# Patient Record
Sex: MALE | Race: WHITE | Employment: FULL TIME | URBAN - METROPOLITAN AREA
[De-identification: names, ages, dates, MRNs, and addresses within clinical notes are randomized per-mention and may not be internally consistent; named-entity substitution may affect disease eponyms.]

---

## 2021-06-04 ENCOUNTER — HOSPITAL ENCOUNTER (INPATIENT)
Age: 21
LOS: 5 days | Discharge: HOME | DRG: 885 | End: 2021-06-09
Attending: PSYCHIATRY & NEUROLOGY | Admitting: PSYCHIATRY & NEUROLOGY
Payer: COMMERCIAL

## 2021-06-04 PROBLEM — F32.A DEPRESSION WITH SUICIDAL IDEATION: Status: ACTIVE | Noted: 2021-06-04

## 2021-06-04 PROBLEM — R45.851 DEPRESSION WITH SUICIDAL IDEATION: Status: ACTIVE | Noted: 2021-06-04

## 2021-06-04 PROCEDURE — 1240000000 HC EMOTIONAL WELLNESS R&B

## 2021-06-05 PROBLEM — F33.2 SEVERE EPISODE OF RECURRENT MAJOR DEPRESSIVE DISORDER, WITHOUT PSYCHOTIC FEATURES (HCC): Status: ACTIVE | Noted: 2021-06-05

## 2021-06-05 PROCEDURE — 6370000000 HC RX 637 (ALT 250 FOR IP): Performed by: PSYCHIATRY & NEUROLOGY

## 2021-06-05 PROCEDURE — 1240000000 HC EMOTIONAL WELLNESS R&B

## 2021-06-05 PROCEDURE — 99223 1ST HOSP IP/OBS HIGH 75: CPT | Performed by: PSYCHIATRY & NEUROLOGY

## 2021-06-05 PROCEDURE — APPSS60 APP SPLIT SHARED TIME 46-60 MINUTES

## 2021-06-05 RX ORDER — TRAZODONE HYDROCHLORIDE 50 MG/1
50 TABLET ORAL NIGHTLY PRN
Status: DISCONTINUED | OUTPATIENT
Start: 2021-06-05 | End: 2021-06-08

## 2021-06-05 RX ORDER — IBUPROFEN 400 MG/1
400 TABLET ORAL EVERY 6 HOURS PRN
Status: DISCONTINUED | OUTPATIENT
Start: 2021-06-05 | End: 2021-06-09 | Stop reason: HOSPADM

## 2021-06-05 RX ORDER — HYDROXYZINE 50 MG/1
50 TABLET, FILM COATED ORAL 3 TIMES DAILY PRN
Status: DISCONTINUED | OUTPATIENT
Start: 2021-06-05 | End: 2021-06-09 | Stop reason: HOSPADM

## 2021-06-05 RX ORDER — MAGNESIUM HYDROXIDE/ALUMINUM HYDROXICE/SIMETHICONE 120; 1200; 1200 MG/30ML; MG/30ML; MG/30ML
30 SUSPENSION ORAL EVERY 6 HOURS PRN
Status: DISCONTINUED | OUTPATIENT
Start: 2021-06-05 | End: 2021-06-09 | Stop reason: HOSPADM

## 2021-06-05 RX ORDER — POLYETHYLENE GLYCOL 3350 17 G/17G
17 POWDER, FOR SOLUTION ORAL DAILY PRN
Status: DISCONTINUED | OUTPATIENT
Start: 2021-06-05 | End: 2021-06-09 | Stop reason: HOSPADM

## 2021-06-05 RX ORDER — ACETAMINOPHEN 325 MG/1
650 TABLET ORAL EVERY 4 HOURS PRN
Status: DISCONTINUED | OUTPATIENT
Start: 2021-06-05 | End: 2021-06-09 | Stop reason: HOSPADM

## 2021-06-05 RX ADMIN — NICOTINE POLACRILEX 2 MG: 2 GUM, CHEWING BUCCAL at 09:44

## 2021-06-05 RX ADMIN — NICOTINE POLACRILEX 2 MG: 2 GUM, CHEWING BUCCAL at 18:08

## 2021-06-05 RX ADMIN — NICOTINE POLACRILEX 2 MG: 2 GUM, CHEWING BUCCAL at 12:02

## 2021-06-05 ASSESSMENT — LIFESTYLE VARIABLES
HISTORY_ALCOHOL_USE: NO
HISTORY_ALCOHOL_USE: NO

## 2021-06-05 ASSESSMENT — SLEEP AND FATIGUE QUESTIONNAIRES
DO YOU USE A SLEEP AID: NO
AVERAGE NUMBER OF SLEEP HOURS: 6
DO YOU HAVE DIFFICULTY SLEEPING: NO

## 2021-06-05 ASSESSMENT — PAIN SCALES - GENERAL: PAINLEVEL_OUTOF10: 0

## 2021-06-05 ASSESSMENT — PATIENT HEALTH QUESTIONNAIRE - PHQ9: SUM OF ALL RESPONSES TO PHQ QUESTIONS 1-9: 5

## 2021-06-05 NOTE — PLAN OF CARE
[address] [facility]  Initial Interdisciplinary Treatment Plan NO      Original treatment plan Date & Time: 6/5/21 4084    Admission Type:  Admission Type:  Involuntary    Reason for admission:   Reason for Admission: depression with suicidal thoughts    Estimated Length of Stay:  5-7days  Estimated Discharge Date: to be determined by physician    PATIENT STRENGTHS:  Patient Strengths:Strengths: Communication, Employment  Patient Strengths and Limitations:Limitations: Difficulty problem solving/relies on others to help solve problems  Addictive Behavior: Addictive Behavior  In the past 3 months, have you felt or has someone told you that you have a problem with:  : None  Do you have a history of Chemical Use?: No  Do you have a history of Alcohol Use?: No  Do you have a history of Street Drug Abuse?: No  Histroy of Prescripton Drug Abuse?: No  Medical Problems:  Past Medical History:   Diagnosis Date    Psychiatric problem      Status EXAM:Status and Exam  Normal: No  Facial Expression: Flat  Affect: Appropriate, Congruent  Level of Consciousness: Alert  Mood:Normal: No  Mood: Depressed  Motor Activity:Normal: Yes  Interview Behavior: Cooperative  Attention:Normal: Yes  Thought Content:Normal: Yes  Hallucinations: None  Delusions: No  Memory:Normal: Yes  Insight and Judgment: No  Insight and Judgment: Poor Insight  Present Suicidal Ideation: No  Present Homicidal Ideation: No    EDUCATION:   Learner Progress Toward Treatment Goals: reviewed group plans and strategies for care    Method:group therapy, medication compliance, individualized assessments and care planning    Outcome: needs reinforcement    PATIENT GOALS: to be discussed with patient within 72 hours    PLAN/TREATMENT RECOMMENDATIONS:     continue group therapy , medications compliance, goal setting, individualized assessments and care, continue to monitor pt on unit      SHORT-TERM GOALS:   Time frame for Short-Term Goals: 5-7 days    LONG-TERM GOALS:  Time frame for Long-Term Goals: 6 months  Members Present in Team Meeting: See Signature Sheet    [place], [place]

## 2021-06-05 NOTE — GROUP NOTE
Group Therapy Note    Date: 6/5/2021    Group Start Time: 1110  Group End Time: 9631  Group Topic: Healthy Living/Wellness    KARAN Guzmán        Group Therapy Note    Attendees: 12/22     Patient's Goal:  Watch video and discuss afterward    Notes:  CLEO talk: Emotional Hygiene/Psychological First Aid    Status After Intervention:  Unchanged    Participation Level: Active Listener    Participation Quality: Appropriate and Attentive      Speech:  normal      Thought Process/Content: Logical      Affective Functioning: Congruent      Mood: stable      Level of consciousness:  Alert and Attentive      Response to Learning: Progressing to goal      Endings: None Reported    Modes of Intervention: Education and Media      Discipline Responsible: Behavorial Health Tech      Signature:   Dagoberto Castillo

## 2021-06-05 NOTE — PLAN OF CARE
Problem: Altered Mood, Depressive Behavior:  Goal: Ability to disclose and discuss suicidal ideas will improve  Description: Ability to disclose and discuss suicidal ideas will improve  6/5/2021 1226 by Isrrael Quijano RN  Outcome: Ongoing   1:1 with pt x ten minutes. Pt encouraged to attend unit programming and interact with peers and staff. Pt also encouraged to tend to hygiene and ADLs. Pt encouraged to discuss feelings with staff and feedback and reassurance provided. Pt denies thoughts of self harm and is agreeable to seeking out should thoughts of self harm arise. Safe environment maintained. Q15 minute checks for safety cont per unit policy. Will cont to monitor for safety and provides support and reassurance as needed. Pt is controlled in behaviors. Attends groups and is social with select peers.

## 2021-06-05 NOTE — CARE COORDINATION
BHI Biopsychosocial Assessment    Current Level of Psychosocial Functioning     Independent   Dependent    Minimal Assist X      Psychosocial High Risk Factors (check all that apply)    Unable to obtain meds   Chronic illness/pain    Substance abuse   Lack of Family Support   Financial stress   Isolation X  Inadequate Community ResourcesX  Suicide attempt(s)  Not taking medications X  Victim of crime   Developmental Delay  Unable to manage personal needs X   Age 70 or older   Homeless  No transportation   Readmission within 30 days  Unemployment  Traumatic Event    Psychiatric Advanced Directives: none reported     Family to Involve in Treatment: Pt sister and father are supportive and involved in his care     Sexual Orientation:  COLLEEN    Patient Strengths:  Adequate housing, employed full time, family support, insurance,     Patient Barriers: Presenting on admission with suicicdal ideation, not linked with [facility], not taking any Psychiatric medications, isolation. Opiate Education Provided: N/A PT denies and does not have a documented history of opiate or Heroin use/abuse. Pt denies any alcohol or illicit drug use. CMHC/mental health history: Pt is not currently linked with a [facility], He reports being linked with [facility] in the past and would like to be relinked with [facility] at discharge he reports that he had 2 inpatient Psychiatric hospitalizations at [facility] when he was younger, and reports that he stopped taking medications approximately two years ago after he felt like it was no longer helpful for him. Plan of Care   medication management, group/individual therapies, family meetings, psycho -education, treatment team meetings to assist with stabilization, referral to community resources. Initial Discharge Plan:  Pt reports a plan to return to live with his dad in [place] at discharge He also reports a plan to follow up with outpatient Treatment at [facility] at discharge.      Clinical Summary:  Trisha Rodrigues

## 2021-06-05 NOTE — H&P
had feelings to end his life by carbon monoxide poisoning and he came to the ER. Patient states that he has been off of this medication for approximately 2 years. He states that he felt that the medication was not helping and so he went off of the medication. Patient endorses generalized anxiety, states that he gets panic attacks where he cannot breathe, and he gets heart palpitations. Patient states that he hyper focuses on breathing, which seems to make the panic attack worse. Patient denies any symptoms of stephanie, denies any auditory or visual hallucinations, denies any obsessive-compulsive disorders, and denies any eating disorders. Patient denies any nightmares, or avoidance behavior. Patient endorses a long history of depressive disorder, states that he started to see a psychologist around the age of 3, and had 3 psychiatric admissions at an adolescent at [facility] in [facility]. PSYCHIATRIC HISTORY:  yes -depression, anxiety  Currently not linked with any mental health providers  1 lifetime suicide attempts  3 psychiatric hospital admissions    Past psychiatric medications includes: Lexapro    Adverse reactions from psychotropic medications: no    Lifetime Psychiatric Review of Systems         Stephanie or Hypomania: denies      Panic Attacks: Endorses     Phobias: denies     Obsessions and Compulsions:denies     Body or Vocal Tics:  denies     Hallucinations:denies     Delusions: Denies   mood congruent    Past Medical History:        Diagnosis Date    Psychiatric problem        Past Surgical History:    History reviewed. No pertinent surgical history. Allergies:  Patient has no known allergies. Social History:     Facundo. LEVEL OF EDUCATION: High school diploma  MARITAL STATUS: Single.  CHILDREN: None  OCCUPATION:   RESIDENCE: Currently lives with his dad and his sister  PATIENT ASSETS: Good family support    DRUG USE HISTORY  Social History Tobacco Use   Smoking Status Never Smoker   Smokeless Tobacco Never Used     Social History     Substance and Sexual Activity   Alcohol Use Never     Social History     Substance and Sexual Activity   Drug Use Never     Denies any drug use, social drinker  LEGAL HISTORY:   HISTORY OF INCARCERATION: no     Family History:   History reviewed. No pertinent family history. Psychiatric Family History  States that mom had mental health issues, cannot verify what she was diagnosed with. Dad is a recovering addict. PHYSICAL EXAM:  Vitals:  /84   Pulse 91   Temp 98.5 °F (36.9 °C) (Oral)   Resp 14   Ht 5' 8\" (1.727 m)   Wt 154 lb (69.9 kg)   BMI 23.42 kg/m²      Review of Systems   Constitutional: Negative for chills and weight loss. HENT: Negative for ear pain and nosebleeds. Eyes: Negative for blurred vision and photophobia. Respiratory: Negative for cough, shortness of breath and wheezing. Cardiovascular: Negative for chest pain and palpitations. Gastrointestinal: Negative for abdominal pain, diarrhea and vomiting. Genitourinary: Negative for dysuria and urgency. Musculoskeletal: Negative for falls and joint pain. Skin: Negative for itching and rash. Neurological: Negative for tremors, seizures and weakness. Endo/Heme/Allergies: Does not bruise/bleed easily. Physical Exam:      Constitutional:  Appears well-developed and well-nourished, no acute distress  HENT:   Head: Normocephalic and atraumatic. Eyes: Conjunctivae are normal. Right eye exhibits no discharge. Left eye exhibits no discharge. No scleral icterus. Neck: Normal range of motion. Neck supple. Pulmonary/Chest:  No respiratory distress or accessory muscle use, no wheezing. Abdominal: Soft. Exhibits no distension. Musculoskeletal: Normal range of motion. Exhibits no edema. Neurological: cranial nerves II-XII grossly in tact, normal gait and station  Skin: Skin is warm and dry. Patient is not diaphoretic. No erythema. Mental Status Examination:    Level of consciousness:  within normal limits   Appearance:  Hospital attire, seated on the side of bed, fair grooming   Behavior/Motor: no abnormalities noted  Attitude toward examiner:  Cooperative  Speech: normal rate and volume  Mood:  Depressed  Affect:  blunted  Thought processes:  Goal directed, linear  Thought content: active suicidal ideations without current plan or intent               denies homicidal ideations               Denies hallucinations              denies delusions  Cognition:  Oriented to self, location, time, situation  Concentration clinically adequate  Memory: intact  Insight &Judgment: poor    DSM-5 Diagnosis  Major depressive disorder, recurrent, severe, without psychosis  Generalized anxiety disorder      Psychosocial and Contextual factors:  Financial  Occupational x   Relationship  Legal   Living situation  Educational     Past Medical History:   Diagnosis Date    Psychiatric problem         TREATMENT PLAN  Start Lexapro 10 mg daily  Encourage patient to participate in unit programming  Attempt to gain insight      Risk Management:  close watch per standard protocol      Psychotherapy:  participation in milieu and group and individual sessions with Attending Physician,  and Physician Assistant/CNP      Estimated length of stay:  2-14 days      GENERAL PATIENT/FAMILY EDUCATION  Patient will understand basic signs and symptoms, Patient will understand benefits/risks and potential side effects from proposed meds and Patient will understand their role in recovery. Family is  active in patient's care. Patient assets that may be helpful during treatment include: Intent to participate and engage in treatment, sufficient fund of knowledge and intellect to understand and utilize treatments. Goals:    1) Remission of suicidal ideation. 2) Stabilization of symptoms prior to discharge.   3) Establish efficacy and tolerability of medications. Behavioral Services  Medicare Certification     Admission Day 1  I certify that this patient's inpatient psychiatric hospital admission is medically necessary for:    x (1) treatment which could reasonably be expected to improve this patient's condition, or    x (2) diagnostic study or its equivalent. Time Spent: 60 minutes     Physicians Signature:  Electronically signed by REBECCA Mabry CNP on 6/5/21 at 3:01 AM EDT  I independently saw and evaluated the patient. I reviewed the midlevel provider's documentation above. Any additional comments or changes to the midlevel provider's documentation are stated below otherwise agree with assessment. The patient was seen face-to-face. He states that he has been feeling overwhelmed with a number of stressful events that have been going on. His work-up in stressful. He has also been redoing his basement to create a small apartment for himself after his sister moved back into the home. The patient began to have suicidal thoughts with a plan to kill himself with carbon monoxide poisoning. The patient also reports anxiety symptoms which are generalized. These are usually triggered by thinking about the stressful circumstances he is living with. The patient estimates that he had 2-3 admissions in the past.  He has a history of attempted suicide by cutting himself. The patient rarely drinks alcohol. He denies using any recreational drugs. In the past he has been treated with Lexapro and Effexor and has found it beneficial but their effect seems to wear off. The patient has also stopped taking medication        PLAN  We will start the patient on Lexapro  Attempt to develop insight  Psycho-education conducted. Supportive Therapy conducted.   Probable discharge is in 2 to 3 days  Follow-up daily while on inpatient unit    Electronically signed by Bon Hicks MD on 6/5/21 at 1:47 PM EDT

## 2021-06-05 NOTE — PROGRESS NOTES
Behavioral Services  Medicare Certification Upon Admission    I certify that this patient's inpatient psychiatric hospital admission is medically necessary for:    [x] (1) Treatment which could reasonably be expected to improve this patient's condition,       [x] (2) Or for diagnostic study;     AND     [x](2) The inpatient psychiatric services are provided while the individual is under the care of a physician and are included in the individualized plan of care.     Estimated length of stay/service 5-9 days    Plan for post-hospital care -outpatient care    Electronically signed by Ileana Drummond MD on 6/5/2021 at 12:31 PM

## 2021-06-05 NOTE — PROGRESS NOTES
`[facility] [place]  Admission Note     Admission Type:   Admission Type:  Involuntary    Reason for admission:  Reason for Admission: depression with suicidal thoughts    PATIENT STRENGTHS:  Strengths: Communication, Employment    Patient Strengths and Limitations:  Limitations: Difficulty problem solving/relies on others to help solve problems    Addictive Behavior:   Addictive Behavior  In the past 3 months, have you felt or has someone told you that you have a problem with:  : None  Do you have a history of Chemical Use?: No  Do you have a history of Alcohol Use?: No  Do you have a history of Street Drug Abuse?: No  Histroy of Prescripton Drug Abuse?: No    Medical Problems:   Past Medical History:   Diagnosis Date    Psychiatric problem        Status EXAM:  Status and Exam  Normal: No  Facial Expression: Flat  Affect: Appropriate, Congruent  Level of Consciousness: Alert  Mood:Normal: No  Mood: Depressed  Motor Activity:Normal: Yes  Interview Behavior: Cooperative  Attention:Normal: Yes  Thought Content:Normal: Yes  Hallucinations: None  Delusions: No  Memory:Normal: Yes  Insight and Judgment: No  Insight and Judgment: Poor Insight  Present Suicidal Ideation: No  Present Homicidal Ideation: No    Tobacco Screening:  Practical Counseling, on admission, luis miguel X, if applicable and completed (first 3 are required if patient doesn't refuse):            ( )  Recognizing danger situations (included triggers and roadblocks)                    ( )  Coping skills (new ways to manage stress, exercise, relaxation techniques, changing routine, distraction)                                                           ( )  Basic information about quitting (benefits of quitting, techniques in how to quit, available resources  ( ) Referral for counseling faxed to Job                                           ( ) Patient refused counseling  ( ) Patient has not smoked in the last 30 days    Metabolic Screening:    No results found for: LABA1C    No results found for: CHOL  No results found for: TRIG  No results found for: HDL  No components found for: LDLCAL  No results found for: LABVLDL      Body mass index is 23.42 kg/m². BP Readings from Last 2 Encounters:   06/05/21 139/84           Pt admitted with followings belongings:  Dentures: None  Vision - Corrective Lenses: None  Hearing Aid: None  Jewelry: None  Body Piercings Removed: N/A  Clothing: Footwear, Pants, Undergarments (Comment)  Were All Patient Medications Collected?: Not Applicable  Other Valuables: Wallet (ID, insurance cards)     Valuables sent home with n/a. Valuables placed in safe in security envelope, number:  [id number]. Patient's home medications were n/a. Patient oriented to surroundings and program expectations and copy of patient rights given. Received admission packet:  yes. Consents reviewed, signed all paperwork. Refused nothing. Patient verbalize understanding:  yes. Patient education on precautions: yes. Pinked/signed in from [facility]. History of depression, first admit. Stopped med's 2 years ago when they stopped working. Lives with dad & sister who are supportive. Denies drugs/alcohol & works as a . Cooperative/pleasant with admit process.                   Colleen Cardona RN

## 2021-06-06 PROCEDURE — 6370000000 HC RX 637 (ALT 250 FOR IP): Performed by: NURSE PRACTITIONER

## 2021-06-06 PROCEDURE — 99231 SBSQ HOSP IP/OBS SF/LOW 25: CPT | Performed by: NURSE PRACTITIONER

## 2021-06-06 PROCEDURE — 1240000000 HC EMOTIONAL WELLNESS R&B

## 2021-06-06 PROCEDURE — 6370000000 HC RX 637 (ALT 250 FOR IP): Performed by: PSYCHIATRY & NEUROLOGY

## 2021-06-06 RX ORDER — ESCITALOPRAM OXALATE 10 MG/1
10 TABLET ORAL DAILY
Status: DISCONTINUED | OUTPATIENT
Start: 2021-06-06 | End: 2021-06-09 | Stop reason: HOSPADM

## 2021-06-06 RX ADMIN — ESCITALOPRAM OXALATE 10 MG: 10 TABLET ORAL at 10:54

## 2021-06-06 RX ADMIN — NICOTINE POLACRILEX 2 MG: 2 GUM, CHEWING BUCCAL at 11:07

## 2021-06-06 RX ADMIN — NICOTINE POLACRILEX 4 MG: 4 GUM, CHEWING BUCCAL at 21:17

## 2021-06-06 RX ADMIN — NICOTINE POLACRILEX 2 MG: 2 GUM, CHEWING BUCCAL at 08:16

## 2021-06-06 RX ADMIN — NICOTINE POLACRILEX 4 MG: 4 GUM, CHEWING BUCCAL at 15:41

## 2021-06-06 RX ADMIN — NICOTINE POLACRILEX 4 MG: 4 GUM, CHEWING BUCCAL at 13:11

## 2021-06-06 RX ADMIN — NICOTINE POLACRILEX 4 MG: 4 GUM, CHEWING BUCCAL at 18:41

## 2021-06-06 NOTE — BH NOTE
Safety checks completed for all patient rooms and common areas. Only food and trash found and removed. No contraband or safety issues noted. Pt. Denies any safety concerns at this time.

## 2021-06-06 NOTE — GROUP NOTE
Group Therapy Note    Date: 6/6/2021    Group Start Time: 0900  Group End Time: 0915  Group Topic: Community Meeting    KARAN Keysmoradha, [address]        Group Therapy Note    Attendees: 8/22         Patient's Goal:  To increase interpersonal interaction. Notes:  Pt attended and participated in group. Status After Intervention:  Improved    Participation Level:  Active Listener and Interactive    Participation Quality: Appropriate, Attentive and Sharing      Speech:  normal      Thought Process/Content: Logical      Affective Functioning: Congruent      Mood: euthymic      Level of consciousness:  Alert and Attentive      Response to Learning: Progressing to goal      Endings: None Reported    Modes of Intervention: Education, Support, Socialization, Clarifying and Reality-testing      Discipline Responsible: Psychoeducational Specialist      Signature:  Flip Rudd

## 2021-06-06 NOTE — PLAN OF CARE
Problem: Altered Mood, Depressive Behavior:  Goal: Absence of self-harm  Description: Absence of self-harm  6/6/2021 1000 by Jason Harris LPN  Outcome: Ongoing   Patient remains free from self harm    Problem: Altered Mood, Depressive Behavior:  Goal: Ability to disclose and discuss suicidal ideas will improve  Description: Ability to disclose and discuss suicidal ideas will improve  6/6/2021 1000 by Jason Harris LPN  Outcome: Ongoing   Patient denies suicidal ideations

## 2021-06-06 NOTE — PROGRESS NOTES
toward examiner: Cooperative, attentive, good eye contact  Speech: spontaneous, normal rate, normal volume and well articulated   Mood: Euthymic  Affect: Mood congruent  Thought processes: linear, goal directed and coherent   Thought content: Denies homicidal ideation  Suicidal Ideation: Endorses improving intermittent suicidal ideation, no plan or intent  Delusions: Not evident  Perceptual Disturbance: patient is not observed responding to internal stimuli  Cognition: Oriented to self, location, time, and situation  Memory: intact  Insight & Judgement: fair     Data   height is 5' 8\" (1.727 m) and weight is 154 lb (69.9 kg). His temperature is 98.3 °F (36.8 °C). His blood pressure is 124/81 and his pulse is 63. His respiration is 15 and oxygen saturation is 100%. Labs:   No visits with results within 2 Day(s) from this visit. Latest known visit with results is:   No results found for any previous visit. Reviewed patient's current plan of care and vital signs with nursing staff.     Labs reviewed: [x] Yes  Last EKG in EMR reviewed: [x] Yes    Medications  Current Facility-Administered Medications: escitalopram (LEXAPRO) tablet 10 mg, 10 mg, Oral, Daily  nicotine polacrilex (NICORETTE) gum 4 mg, 4 mg, Oral, PRN  acetaminophen (TYLENOL) tablet 650 mg, 650 mg, Oral, Q4H PRN  ibuprofen (ADVIL;MOTRIN) tablet 400 mg, 400 mg, Oral, Q6H PRN  polyethylene glycol (GLYCOLAX) packet 17 g, 17 g, Oral, Daily PRN  aluminum & magnesium hydroxide-simethicone (MAALOX) 200-200-20 MG/5ML suspension 30 mL, 30 mL, Oral, Q6H PRN  hydrOXYzine (ATARAX) tablet 50 mg, 50 mg, Oral, TID PRN  traZODone (DESYREL) tablet 50 mg, 50 mg, Oral, Nightly PRN    ASSESSMENT  Severe episode of recurrent major depressive disorder, without psychotic features ([facility] [address]         PLAN  Patient symptoms are: Improving   Continue Lexapro 10 mg by mouth daily  Monitor need and frequency of PRN medications  Encourage participation in groups and milieu Attempt to develop insight  Psycho-education conducted  Supportive Therapy conducted  Probable discharge: To be determined by attending physician  Follow-up daily while inpatient    Patient continues to be monitored in the inpatient psychiatric facility at [facility] for safety and stabilization. Patient continues to need, on a daily basis, active treatment furnished directly by or requiring the supervision of inpatient psychiatric personnel. Electronically signed by REBECCA Driver CNP on 6/6/2021 at 2:55 PM    **This report has been created using voice recognition software. It may contain minor errors which are inherent in voice recognition technology. **

## 2021-06-06 NOTE — PLAN OF CARE
5 [facility]  Day 3 Interdisciplinary Treatment Plan NOTE    Review Date & Time: 6/6/21 1115    Admission Type:   Admission Type:  Involuntary    Reason for admission:  Reason for Admission: depression with suicidal thoughts  Estimated Length of Stay: 5-7 days  Estimated Discharge Date Update: to be determined by physician    PATIENT STRENGTHS:  Patient Strengths Strengths: Communication, Employment  Patient Strengths and Limitations:Limitations: Tendency to isolate self, Difficult relationships / poor social skills, Difficulty problem solving/relies on others to help solve problems  Addictive Behavior:Addictive Behavior  In the past 3 months, have you felt or has someone told you that you have a problem with:  : None  Do you have a history of Chemical Use?: No  Do you have a history of Alcohol Use?: No  Do you have a history of Street Drug Abuse?: No  Histroy of Prescripton Drug Abuse?: No  Medical Problems:  Past Medical History:   Diagnosis Date    Psychiatric problem        Risk:  Fall RiskTotal: 55  Eric Scale Eric Scale Score: 22  BVC Total: 0  Change in scores no Changes to plan of Care no    Status EXAM:   Status and Exam  Normal: Yes  Facial Expression: Flat  Affect: Appropriate  Level of Consciousness: Alert  Mood:Normal: No  Mood: Depressed  Motor Activity:Normal: Yes  Interview Behavior: Cooperative  Preception: [place] to Person, [place] to Time, [place] to Place, [place] to Situation  Attention:Normal: Yes  Thought Processes: Circumstantial  Thought Content:Normal: Yes  Hallucinations: None  Delusions: No  Memory:Normal: Yes  Insight and Judgment: No  Insight and Judgment: Poor Insight  Present Suicidal Ideation: No  Present Homicidal Ideation: No    Daily Assessment Last Entry:   Daily Sleep (WDL): Within Defined Limits         Patient Currently in Pain: Denies  Daily Nutrition (WDL): Within Defined Limits    Patient Monitoring:  Frequency of Checks: 4 times per hour, close Psychiatric Symptoms:   Depression Symptoms  Depression Symptoms: Isolative, Loss of interest  Anxiety Symptoms  Anxiety Symptoms: Generalized  Stephanie Symptoms  Stephanie Symptoms: No problems reported or observed. Psychosis Symptoms  Delusion Type: No problems reported or observed. Suicide Risk CSSR-S:  1) Within the past month, have you wished you were dead or wished you could go to sleep and not wake up? : Yes  2) Have you actually had any thoughts of killing yourself? : Yes  3) Have you been thinking about how you might kill yourself? : Yes  5) Have you started to work out or worked out the details of how to kill yourself? Do you intend to carry out this plan? : No  6) Have you ever done anything, started to do anything, or prepared to do anything to end your life?: No  Change in Result no Change in Plan of care no      EDUCATION:   EDUCATION:   Learner Progress Toward Treatment Goals: Reviewed results and recommendations of this team, Reviewed group plan and strategies, Reviewed signs, symptoms and risk of self harm and violent behavior, Reviewed goals and plan of care    Method:small group, individual verbal education    Outcome:verbalized by patient, but needs reinforcement to obtain goals    PATIENT GOALS:  Short term: To be more open with his feeling with his sister   Long term: To finish his basement, so he has a place to relax away from everyone else.      PLAN/TREATMENT RECOMMENDATIONS UPDATE: continue with group therapies, increased socialization, continue planning for after discharge goals, continue with medication compliance    SHORT-TERM GOALS UPDATE:   Time frame for Short-Term Goals: 5-7 days    LONG-TERM GOALS UPDATE:   Time frame for Long-Term Goals: 6 months  Members Present in Team Meeting: See Signature Sheet    [place], [place]

## 2021-06-06 NOTE — GROUP NOTE
Group Therapy Note    Date: 6/5/2021    Group Start Time: 2030  Group End Time: 2119  Group Topic: Wrap-Up    KARAN Almanzar        Group Therapy Note    Attendees: 10         Patient's Goal:  Think more positive    Notes:  I just got here late last night    Status After Intervention:  Improved    Participation Level: Interactive    Participation Quality: Attentive      Speech:  normal      Thought Process/Content: Logical      Affective Functioning: Blunted      Mood: anxious      Level of consciousness:  Alert      Response to Learning: Progressing to goal      Endings: None Reported    Modes of Intervention: Problem-solving      Discipline Responsible: [place]      Signature:  Vickey Fonseca

## 2021-06-07 PROCEDURE — 1240000000 HC EMOTIONAL WELLNESS R&B

## 2021-06-07 PROCEDURE — 6370000000 HC RX 637 (ALT 250 FOR IP): Performed by: NURSE PRACTITIONER

## 2021-06-07 PROCEDURE — 99232 SBSQ HOSP IP/OBS MODERATE 35: CPT | Performed by: PSYCHIATRY & NEUROLOGY

## 2021-06-07 PROCEDURE — 6370000000 HC RX 637 (ALT 250 FOR IP)

## 2021-06-07 PROCEDURE — APPSS30 APP SPLIT SHARED TIME 16-30 MINUTES: Performed by: PSYCHIATRY & NEUROLOGY

## 2021-06-07 RX ADMIN — NICOTINE POLACRILEX 4 MG: 4 GUM, CHEWING BUCCAL at 19:58

## 2021-06-07 RX ADMIN — NICOTINE POLACRILEX 4 MG: 4 GUM, CHEWING BUCCAL at 10:06

## 2021-06-07 RX ADMIN — NICOTINE POLACRILEX 4 MG: 4 GUM, CHEWING BUCCAL at 17:00

## 2021-06-07 RX ADMIN — NICOTINE POLACRILEX 4 MG: 4 GUM, CHEWING BUCCAL at 08:14

## 2021-06-07 RX ADMIN — NICOTINE POLACRILEX 4 MG: 4 GUM, CHEWING BUCCAL at 21:07

## 2021-06-07 RX ADMIN — NICOTINE POLACRILEX 4 MG: 4 GUM, CHEWING BUCCAL at 14:17

## 2021-06-07 RX ADMIN — NICOTINE POLACRILEX 4 MG: 4 GUM, CHEWING BUCCAL at 18:28

## 2021-06-07 RX ADMIN — NICOTINE POLACRILEX 4 MG: 4 GUM, CHEWING BUCCAL at 12:39

## 2021-06-07 RX ADMIN — ESCITALOPRAM OXALATE 10 MG: 10 TABLET ORAL at 07:46

## 2021-06-07 RX ADMIN — TRAZODONE HYDROCHLORIDE 50 MG: 50 TABLET ORAL at 22:05

## 2021-06-07 NOTE — GROUP NOTE
Group Therapy Note    Date: 6/7/2021    Group Start Time: 1105  Group End Time: 5506  Group Topic: Music Therapy    KARAN GARSIA    Si Dry        Group Therapy Note    Attendees: 9/19       Patient's Goal:  Patients shared music with each other, and offered advice based on dre analysis of the songs they chose. Goals to increase self-expression, increase sense of community, improve rapport, normalize environment, and provide support and encouragement on patients desired topics. (Topics included maintaining sobriety, opposite action, mood regulation, self-esteem, and other mental health related topics)    Notes:  Patient attended and participated in second half of group, having positive interactions with peers and staff. Patient shared music and engaged appropriately in sharing. Shared song Cat's In The Cradle by Mary Kate Harris and shared with peers to [place] family and time with loved ones while you got it. \"    Status After Intervention:  Improved    Participation Level:  Active Listener and Interactive    Participation Quality: Appropriate, Attentive, Sharing and Supportive      Speech:  normal      Thought Process/Content: Logical  Linear      Affective Functioning: Congruent      Mood: euthymic      Level of consciousness:  Alert and Attentive      Response to Learning: Able to verbalize current knowledge/experience and Progressing to goal      Endings: None Reported    Modes of Intervention: Education, Support, Socialization, Exploration, Activity, Media and Reality-testing      Discipline Responsible: Psychoeducational Specialist      Signature:  Stella Ugarte

## 2021-06-07 NOTE — GROUP NOTE
Group Therapy Note    Date: 6/7/2021    Group Start Time: 1000  Group End Time: 3456  Group Topic: Group Therapy    KARAN MAX    KRZYSZTOF Flores LSW        Group Therapy Note    Attendees: 10/21         Patient's Goal:  Increase interpersonal relationship skills    Notes:  Patient was an active participant in group discussion     Status After Intervention:  Unchanged    Participation Level:  Active Listener and Interactive    Participation Quality: Appropriate, Attentive, Sharing and Supportive      Speech:  normal      Thought Process/Content: Logical      Affective Functioning: Congruent      Mood: anxious      Level of consciousness:  Alert, Oriented X4, attentive      Response to Learning: Able to verbalize current knowledge/experience      Endings: None Reported    Modes of Intervention: Support, Socialization, Exploration and Clarifying      Discipline Responsible: /Counselor      Signature:  KRZYSZTOF Flores LSW

## 2021-06-07 NOTE — PLAN OF CARE
Problem: Altered Mood, Depressive Behavior:  Goal: Ability to disclose and discuss suicidal ideas will improve  Description: Ability to disclose and discuss suicidal ideas will improve  6/6/2021 2226 by Bethanie Small  Outcome: Met This Shift  Pt denies suicidal ideation. He is active on unit & social with peers. Relating he feels better. Pt is vague when relating his stressors. He is compliant with medicine & has a good appetite. Attended group with fair participation. Pt denies hallucinations, denies homicidal thoughts. Problem: Altered Mood, Depressive Behavior:  Goal: Absence of self-harm  Description: Absence of self-harm  6/6/2021 2226 by Bethanie Small  Outcome: Met This Shift  Pt remains safe & free from self harm behaviors.

## 2021-06-07 NOTE — GROUP NOTE
Group Therapy Note    Date: 6/6/2021    Group Start Time: 2030  Group End Time: 2120  Group Topic: Wrap-Up    KARAN Cope Mantel        Group Therapy Note    Attendees: 18         Patient's Goal:  I am finally laughing today & it feels good    Notes:  I want to learn how to not take things so seriously    Status After Intervention:  Improved    Participation Level: Interactive    Participation Quality: Supportive      Speech:  normal      Thought Process/Content: Logical      Affective Functioning: Congruent      Mood: anxious      Level of consciousness:  Alert      Response to Learning: Capable of insight      Endings: None Reported    Modes of Intervention: Problem-solving      Discipline Responsible: [place]      Signature:  Amanuel Yung

## 2021-06-07 NOTE — PROGRESS NOTES
Daily Progress Note  6/7/2021    Patient Name: Arturo Smith    CHIEF COMPLAINT: Depression with suicidal ideation         SUBJECTIVE:    Nursing staff report patient has maintained medication adherence and has been with out acute behavioral changes in the last 24 hours. This morning he is agreeable to assessment in the privacy of [employer]. Patient states his mood is \"not too bad \"and he reports improving suicidal suicidal ideation. After exploring medications he denies side effects related to his current dose of Lexapro and reports that he has utilized this medication previously and it has been beneficial to his depressive symptoms. He reports that he has a support system including his sister who is a . He is hoping that he can return to his job as an  as his father and grandfather have both been in his line of work and the family takes great pride in this position. Patient endorses stress related to his concerns of employment as he states the union is not always as excepting of sick time as other employers he has had previously. He denies having questions or concerns related to his current treatment plan. At this time, the patient is not appropriate for a lower level of care. The patient is unable to contract for safety off the unit and patient warrants further hospitalization for safety and stabilization.      Appetite:  [x] Normal/Adequate/Unchanged  [] Increased  [] Decreased      Sleep:       [] Normal/Adequate/Unchanged  [x] Fair  [] Poor      Group Attendance on Unit:   [x] Yes  [] Selectively    [] No    Medication Side Effects: Denies         Mental Status Exam  Level of consciousness: Alert and awake   Appearance: Appropriate attire for setting, seated in chair, with fair  grooming and hygiene   Behavior/Motor: Approachable, no psychomotor abnormalities   Attitude toward examiner: Cooperative, attentive, good eye contact  Speech: spontaneous, normal rate, normal volume and well articulated   Mood: Patient reports \"not too bad \"  Affect: Mood congruent  Thought processes: linear, goal directed and coherent   Thought content: Denies homicidal ideation  Suicidal Ideation: Endorses improving suicidal ideation, no plan or intent and contracts for safety while on acute care psychiatric locked unit  Delusions: Denies and not evident  Perceptual Disturbance: Denies auditory or visual hallucinations  Cognition: Oriented to self, location, time, and situation  Memory: intact  Insight & Judgement: Improving    Data   height is 5' 8\" (1.727 m) and weight is 154 lb (69.9 kg). His temperature is 98.4 °F (36.9 °C). His blood pressure is 144/80 (abnormal) and his pulse is 64. His respiration is 14 and oxygen saturation is 100%. Labs:   No visits with results within 2 Day(s) from this visit. Latest known visit with results is:   No results found for any previous visit. Reviewed patient's current plan of care and vital signs with nursing staff.     Labs reviewed: [x] Yes  Last EKG in EMR reviewed: [x] Yes    Medications  Current Facility-Administered Medications: escitalopram (LEXAPRO) tablet 10 mg, 10 mg, Oral, Daily  nicotine polacrilex (NICORETTE) gum 4 mg, 4 mg, Oral, PRN  acetaminophen (TYLENOL) tablet 650 mg, 650 mg, Oral, Q4H PRN  ibuprofen (ADVIL;MOTRIN) tablet 400 mg, 400 mg, Oral, Q6H PRN  polyethylene glycol (GLYCOLAX) packet 17 g, 17 g, Oral, Daily PRN  aluminum & magnesium hydroxide-simethicone (MAALOX) 200-200-20 MG/5ML suspension 30 mL, 30 mL, Oral, Q6H PRN  hydrOXYzine (ATARAX) tablet 50 mg, 50 mg, Oral, TID PRN  traZODone (DESYREL) tablet 50 mg, 50 mg, Oral, Nightly PRN    ASSESSMENT  Severe episode of recurrent major depressive disorder, without psychotic features ([facility] [address]         PLAN  Patient symptoms are: Improving   Continue Lexapro 10 mg by mouth daily  Nicotine patch has been discontinue and patient will utilize, only as needed  Monitor need and frequency of PRN medications  Encourage participation in groups and milieu  Attempt to develop insight  Psycho-education conducted  Supportive Therapy conducted  Probable discharge: To be determined by attending physician  Follow-up daily while inpatient    Patient continues to be monitored in the inpatient psychiatric facility at [facility] for safety and stabilization. Patient continues to need, on a daily basis, active treatment furnished directly by or requiring the supervision of inpatient psychiatric personnel. Electronically signed by REBECCA Brush CNP on 6/7/2021 at 1:45 PM    **This report has been created using voice recognition software. It may contain minor errors which are inherent in voice recognition technology. **                                         Psychiatry Attending Attestation     I independently saw and evaluated the patient. I reviewed the Advance Practice Provider's documentation above. Any additional comments or changes to the Advance Practice Provider's documentation are stated below otherwise agree with assessment. Patient has a lot of anticipatory anxiety regarding his work. Explored thoughts about his agoraphobia. Session was supportive. Notes that his suicidal thoughts are slowly getting better. Mentions that he has always focused on other people and not on himself and he slowly started focusing on himself in last few weeks which led to severe guilt that led to depression. Reports some feelings of helplessness and hopelessness. Tolerating medication adjustments well and denies any side effect from the medication. We will continue same medication today and observe. Possible discharge is by Wednesday morning.     Electronically signed by Madeline Smith MD on 6/7/21 at 3:57 PM EDT

## 2021-06-07 NOTE — GROUP NOTE
Group Therapy Note    Date: 6/7/2021    Group Start Time: 8864  Group End Time: 0915  Group Topic: Community Meeting    KARAN Castellano [place]    Attendees: 10         Patient's Goal:  To be informed of programming schedule for today and unit guidelines/rules. To verbalize an obtainable short term goal for today pertaining to mental health and stability that can be achieved by this evening. Notes:  Patient attended group and actively participated. Status After Intervention:  Improved    Participation Level:  Active Listener and Interactive    Participation Quality: Appropriate, Attentive and Sharing      Speech:  normal      Thought Process/Content: Logical      Affective Functioning: Congruent      Mood: euthymic      Level of consciousness:  Alert, Oriented x4 and Attentive      Response to Learning: Able to verbalize current knowledge/experience, Able to verbalize/acknowledge new learning, Able to retain information, Capable of insight and Progressing to goal      Endings: None Reported    Modes of Intervention: Support, Socialization, Clarifying, Problem-solving, Confrontation, Limit-setting and Reality-testing      Discipline Responsible: Psychoeducational Specialist      Signature:  Meagan Jin

## 2021-06-07 NOTE — PLAN OF CARE
Problem: Altered Mood, Depressive Behavior:  Goal: Ability to disclose and discuss suicidal ideas will improve  Description: Ability to disclose and discuss suicidal ideas will improve  6/7/2021 1056 by Cesar Mccall RN  Outcome: Ongoing   1:1 with pt x ten minutes. Pt encouraged to attend unit programming and interact with peers and staff. Pt also encouraged to tend to hygiene and ADLs. Pt encouraged to discuss feelings with staff and feedback and reassurance provided. Pt denies thoughts of self harm and is agreeable to seeking out should thoughts of self harm arise. Safe environment maintained. Q15 minute checks for safety cont per unit policy. Will cont to monitor for safety and provides support and reassurance as needed. Pt is controlled in behaviors. Compliant with medications. Attends groups. Social with peers.

## 2021-06-08 PROCEDURE — 99232 SBSQ HOSP IP/OBS MODERATE 35: CPT | Performed by: PSYCHIATRY & NEUROLOGY

## 2021-06-08 PROCEDURE — 6370000000 HC RX 637 (ALT 250 FOR IP): Performed by: NURSE PRACTITIONER

## 2021-06-08 PROCEDURE — 1240000000 HC EMOTIONAL WELLNESS R&B

## 2021-06-08 PROCEDURE — APPSS30 APP SPLIT SHARED TIME 16-30 MINUTES: Performed by: PSYCHIATRY & NEUROLOGY

## 2021-06-08 PROCEDURE — 6370000000 HC RX 637 (ALT 250 FOR IP): Performed by: PSYCHIATRY & NEUROLOGY

## 2021-06-08 RX ORDER — LANOLIN ALCOHOL/MO/W.PET/CERES
9 CREAM (GRAM) TOPICAL NIGHTLY PRN
Status: DISCONTINUED | OUTPATIENT
Start: 2021-06-08 | End: 2021-06-09 | Stop reason: HOSPADM

## 2021-06-08 RX ADMIN — NICOTINE POLACRILEX 4 MG: 4 GUM, CHEWING BUCCAL at 15:12

## 2021-06-08 RX ADMIN — NICOTINE POLACRILEX 4 MG: 4 GUM, CHEWING BUCCAL at 22:40

## 2021-06-08 RX ADMIN — NICOTINE POLACRILEX 4 MG: 4 GUM, CHEWING BUCCAL at 12:45

## 2021-06-08 RX ADMIN — ESCITALOPRAM OXALATE 10 MG: 10 TABLET ORAL at 08:03

## 2021-06-08 RX ADMIN — NICOTINE POLACRILEX 4 MG: 4 GUM, CHEWING BUCCAL at 17:37

## 2021-06-08 RX ADMIN — Medication 9 MG: at 22:24

## 2021-06-08 RX ADMIN — NICOTINE POLACRILEX 4 MG: 4 GUM, CHEWING BUCCAL at 08:15

## 2021-06-08 RX ADMIN — NICOTINE POLACRILEX 4 MG: 4 GUM, CHEWING BUCCAL at 18:44

## 2021-06-08 RX ADMIN — NICOTINE POLACRILEX 4 MG: 4 GUM, CHEWING BUCCAL at 21:41

## 2021-06-08 RX ADMIN — NICOTINE POLACRILEX 4 MG: 4 GUM, CHEWING BUCCAL at 09:48

## 2021-06-08 NOTE — GROUP NOTE
Group Therapy Note    Date: 6/8/2021    Group Start Time: 1000  Group End Time: 3211  Group Topic: Group Therapy    KARAN MAX    KRZYSZTOF Laguna LSW        Group Therapy Note    Attendees: 3/11         Patient's Goal:  Increase interpersonal relationship skills    Notes:  Patient was an active participant in group discussion     Status After Intervention:  Unchanged    Participation Level:  Active Listener and Interactive    Participation Quality: Appropriate, Attentive, Sharing and Supportive      Speech:  normal      Thought Process/Content: Logical      Affective Functioning: Congruent      Mood: anxious      Level of consciousness:  Alert, Oriented x4 and Attentive      Response to Learning: Able to verbalize current knowledge/experience      Endings: None Reported    Modes of Intervention: Support, Socialization, Exploration and Clarifying      Discipline Responsible: /Counselor      Signature:  KRZYSZTOF Laguna LSW

## 2021-06-08 NOTE — GROUP NOTE
Group Therapy Note    Date: 6/8/2021    Group Start Time: 1330  Group End Time: 9850  Group Topic: Music Therapy    KARAN GARSIA    Daksha Matos        Group Therapy Note    Attendees: 5/19       Patient's Goal:  Patients shared music and answered related questions, and shared stories and thoughts based on selections. Goals to increase self-expression, normalize environment, and increase sense of community    Notes:  Patient attended and participated in group, having positive interactions with peers and staff. Requested song [place] by Shanell Blood and talked about technical musical skills and shared stories with peers. Status After Intervention:  Improved    Participation Level:  Active Listener and Interactive    Participation Quality: Appropriate, Attentive, Sharing and Supportive      Speech:  normal      Thought Process/Content: Logical  Linear      Affective Functioning: Congruent      Mood: euthymic      Level of consciousness:  Alert and Attentive      Response to Learning: Able to verbalize current knowledge/experience and Progressing to goal      Endings: None Reported    Modes of Intervention: Support, Socialization, Exploration, Activity, Media and Reality-testing      Discipline Responsible: Psychoeducational Specialist      Signature:  Daksha Matos

## 2021-06-08 NOTE — GROUP NOTE
Group Therapy Note    Date: 6/8/2021    Group Start Time: 1652  Group End Time: 0920  Group Topic: Community Meeting    KARAN RENAI A    Monie [place], [place]    Attendees: 9         Patient's Goal:  To be informed of programming schedule for today and unit guidelines/rules. To verbalize an obtainable short term goal for today pertaining to mental health and stability that can be achieved by this evening. Notes:  Patient attended group and actively participated. Status After Intervention:  Improved    Participation Level:  Active Listener and Interactive    Participation Quality: Appropriate, Attentive and Sharing      Speech:  normal      Thought Process/Content: Logical      Affective Functioning: Congruent      Mood: euthymic      Level of consciousness:  Alert, Oriented x4 and Attentive      Response to Learning: Able to verbalize current knowledge/experience, Able to verbalize/acknowledge new learning, Able to retain information, Capable of insight and Progressing to goal      Endings: None Reported       Modes of Intervention: Support, Socialization, Exploration, Clarifying, Problem-solving, Confrontation, Limit-setting and Reality-testing      Discipline Responsible: Psychoeducational Specialist      Signature:  Killian Morgan

## 2021-06-08 NOTE — FLOWSHEET NOTE
*Patient participated in the Spirituality Group       06/08/21 3135   Encounter Summary   Services provided to: Patient   Referral/Consult From: Rounding   Continue Visiting   (6/8/21)   Complexity of Encounter Moderate   Length of Encounter 30 minutes   Spiritual/Taoist   Type Spiritual support   Assessment Calm; Approachable   Intervention Active listening   Outcome Receptive;Engaged in conversation

## 2021-06-08 NOTE — GROUP NOTE
Group Therapy Note    Date: 6/8/2021    Group Start Time: 1100  Group End Time: 7226  Group Topic: Recreational    KARAN GARSIA    Daksha Matos        Group Therapy Note    Attendees: 3/10         Patient's Goal:  Provide leisure opportunities, increase socialization, and normalize environemnt    Notes:  Patient joined group, and was pleasant and engaging throughout, having positive interacitons with peers and staff. Engaged in game and conversation throughout. Patient was pulled from group for meeting and returned towards end of group    Status After Intervention:  Improved    Participation Level:  Active Listener and Interactive    Participation Quality: Appropriate, Attentive and Sharing      Speech:  normal      Thought Process/Content: Logical  Linear      Affective Functioning: Congruent      Mood: euthymic      Level of consciousness:  Alert and Attentive      Response to Learning: Able to verbalize current knowledge/experience and Progressing to goal      Endings: None Reported    Modes of Intervention: Support, Socialization, Activity and Reality-testing      Discipline Responsible: Psychoeducational Specialist      Signature:  Daksha Matos

## 2021-06-08 NOTE — PLAN OF CARE
Problem: Altered Mood, Depressive Behavior:  Goal: Ability to disclose and discuss suicidal ideas will improve  Description: Ability to disclose and discuss suicidal ideas will improve  Outcome: Ongoing   Patient denies suicidal ideations. Patient agrees to notify staff if symptoms arise. Patient is brightened and social with peers. Patient is compliant with his medications. Problem: Altered Mood, Depressive Behavior:  Goal: Absence of self-harm  Description: Absence of self-harm  Outcome: Ongoing   Patient denies thoughts of self harm. Patient agrees to notify staff if symptoms arise. Patient remains free from harm. Patient safety maintained q15 minute checks.

## 2021-06-08 NOTE — PLAN OF CARE
Problem: Altered Mood, Depressive Behavior:  Goal: Ability to disclose and discuss suicidal ideas will improve  Description: Ability to disclose and discuss suicidal ideas will improve  6/8/2021 0906 by Allie Muse RN  Outcome: Ongoing     Problem: Altered Mood, Depressive Behavior:  Goal: Absence of self-harm  Description: Absence of self-harm  6/8/2021 0906 by Allie Muse RN  Outcome: Ongoing     Patient denies thoughts of self harm or thoughts to harm others. Patient denies hallucinations. Patient states he feels ready for his discharge the provider will be tomorrow. Patient takes medications as ordered. Will continue to monitor and offer support as needed.

## 2021-06-08 NOTE — PROGRESS NOTES
Daily Progress Note  6/8/2021    Patient Name: Angy Gomez    CHIEF COMPLAINT: Depression with suicidal ideation         SUBJECTIVE:    Nursing staff report patient has maintained medication adherence and has been with out acute behavioral changes in the last 24 hours. Patient did utilize trazodone last evening related to insomnia and reports that he experienced restlessness. Discussed risks versus benefits and alternatives and it is mutually agreed that patient will trial melatonin tonight as he has previously. 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 Approached topic that patient remains very fixated on this topic and questions if he is doing this for himself or his father, and grandfather. Patient has difficulty verbalizing which really makes himself happy and requires much prompting to explore his own needs. He is able to identify 1) riding his motorcycle 2) preparing shrimp scampi and 3) spending time outdoors. During interaction he easily returns to topic of work, stating that if he loses his job he feels he will again want to end his life. Currently he endorses improving suicidal ideation and is able to contract for safety. Discussed the importance of follow-up on an outpatient basis, and to continue to build his own self-confidence and coping mechanisms and patient agrees this will be beneficial.  He denies side effects related to current dose of Escitalopram or having questions or concerns related to treatment. At this time, the patient is not appropriate for a lower level of care. The patient is unable to contract for safety off the unit and patient warrants further hospitalization for safety and stabilization.      Appetite:  [x] Normal/Adequate/Unchanged  [] Increased  [] Decreased      Sleep:       [] Normal/Adequate/Unchanged  [x] Fair  [] Poor Group Attendance on Unit:   [x] Yes  [] Selectively    [] No    Medication Side Effects: Denies         Mental Status Exam  Level of consciousness: Alert and awake   Appearance: Appropriate attire for setting, seated in chair, with good grooming and hygiene   Behavior/Motor: Calm, no psychomotor abnormalities   Attitude toward examiner: Cooperative, attentive, good eye contact  Speech: spontaneous, normal rate, normal volume and well articulated   Mood: Patient reports \"pretty good\"  Affect: Mood congruent  Thought processes: linear, goal directed and coherent   Thought content: Denies homicidal ideation  Suicidal Ideation: Endorses improving suicidal ideation, no plan or intent and contracts for safety while on acute care psychiatric locked unit  Delusions: Denies and not evident  Perceptual Disturbance: Denies auditory or visual hallucinations  Cognition: Oriented to self, location, time, and situation  Memory: intact  Insight & Judgement: Improving    Data   height is 5' 8\" (1.727 m) and weight is 154 lb (69.9 kg). His oral temperature is 98.6 °F (37 °C). His blood pressure is 130/78 and his pulse is 89. His respiration is 14 and oxygen saturation is 100%. Labs:   No visits with results within 2 Day(s) from this visit. Latest known visit with results is:   No results found for any previous visit. Reviewed patient's current plan of care and vital signs with nursing staff.     Labs reviewed: [x] Yes  Last EKG in EMR reviewed: [x] Yes    Medications  Current Facility-Administered Medications: escitalopram (LEXAPRO) tablet 10 mg, 10 mg, Oral, Daily  nicotine polacrilex (NICORETTE) gum 4 mg, 4 mg, Oral, PRN  acetaminophen (TYLENOL) tablet 650 mg, 650 mg, Oral, Q4H PRN  ibuprofen (ADVIL;MOTRIN) tablet 400 mg, 400 mg, Oral, Q6H PRN  polyethylene glycol (GLYCOLAX) packet 17 g, 17 g, Oral, Daily PRN  aluminum & magnesium hydroxide-simethicone (MAALOX) 200-200-20 MG/5ML suspension 30 mL, 30 mL, Oral, Q6H PRN hydrOXYzine (ATARAX) tablet 50 mg, 50 mg, Oral, TID PRN  traZODone (DESYREL) tablet 50 mg, 50 mg, Oral, Nightly PRN    ASSESSMENT  Severe episode of recurrent major depressive disorder, without psychotic features ([facility] [address]         PLAN  Patient symptoms are: Improving   Continue with current medication regimen and monitor symptoms   monitor need and frequency of PRN medications  Encourage participation in groups and milieu  Attempt to develop insight  Psycho-education conducted  Supportive Therapy conducted  Probable discharge: To be determined by attending physician  Follow-up daily while inpatient    Patient continues to be monitored in the inpatient psychiatric facility at [facility] for safety and stabilization. Patient continues to need, on a daily basis, active treatment furnished directly by or requiring the supervision of inpatient psychiatric personnel. Electronically signed by REBECCA Becker CNP on 6/8/2021 at 12:52 PM    **This report has been created using voice recognition software. It may contain minor errors which are inherent in voice recognition technology. **                                         Psychiatry Attending Attestation     I independently saw and evaluated the patient. I reviewed the Advance Practice Provider's documentation above. Any additional comments or changes to the Advance Practice Provider's documentation are stated below otherwise agree with assessment. Patient feels better than before. Mood and affect are better. Patient reports fleeting suicidal thoughts with no intent or plan. Patient notes that these thoughts are occurring less frequently. Denies any homicidal thoughts, that was explored with the patient. Oriented to time place and person. Recent and remote memory is intact. Patient feels hopeful. Continues to struggle with falling asleep and staying asleep. Mentions that melatonin helps him better.   Discussed about adding melatonin today. Looking forward to go ride his motorcycle. No side effect from medication reported. Side-effect of medication were discussed with the patient . Patient is responding to current treatment. Discharge soon, if patient continues to show improvement. Case discussed with the staff.       Electronically signed by Wood Hidalgo MD on 6/8/21 at 4:51 PM EDT

## 2021-06-09 VITALS
OXYGEN SATURATION: 100 % | BODY MASS INDEX: 23.34 KG/M2 | RESPIRATION RATE: 16 BRPM | HEIGHT: 68 IN | HEART RATE: 85 BPM | DIASTOLIC BLOOD PRESSURE: 71 MMHG | SYSTOLIC BLOOD PRESSURE: 127 MMHG | WEIGHT: 154 LBS | TEMPERATURE: 97.5 F

## 2021-06-09 PROCEDURE — 6370000000 HC RX 637 (ALT 250 FOR IP): Performed by: NURSE PRACTITIONER

## 2021-06-09 PROCEDURE — 99239 HOSP IP/OBS DSCHRG MGMT >30: CPT | Performed by: PSYCHIATRY & NEUROLOGY

## 2021-06-09 RX ORDER — ESCITALOPRAM OXALATE 10 MG/1
10 TABLET ORAL DAILY
Qty: 30 TABLET | Refills: 3 | Status: SHIPPED | OUTPATIENT
Start: 2021-06-10

## 2021-06-09 RX ADMIN — NICOTINE POLACRILEX 4 MG: 4 GUM, CHEWING BUCCAL at 08:21

## 2021-06-09 RX ADMIN — NICOTINE POLACRILEX 4 MG: 4 GUM, CHEWING BUCCAL at 06:25

## 2021-06-09 RX ADMIN — ESCITALOPRAM OXALATE 10 MG: 10 TABLET ORAL at 08:21

## 2021-06-09 NOTE — PROGRESS NOTES
CLINICAL PHARMACY NOTE: MEDS TO BEDS    Total # of Prescriptions Filled: 1   The following medications were delivered to the patient:  · Lexapro    Additional Documentation:  copay of $5.09 billed to patient

## 2021-06-09 NOTE — DISCHARGE SUMMARY
Provider Discharge Summary     Patient ID:  Cedric Ennis  [id number]  8 y.o.  2000    Admit date: 6/4/2021    Discharge date and time: 6/9/2021  7:12 PM     Admitting Physician: Shaina Dutta MD     Discharge Physician: Shaina Dutta MD    Admission Diagnoses: Depression with suicidal ideation [F32.9, R45.851]    Discharge Diagnoses:      Severe episode of recurrent major depressive disorder, without psychotic features [facility])     Patient Active Problem List   Diagnosis Code    Depression with suicidal ideation F32.9, R45.851    Severe episode of recurrent major depressive disorder, without psychotic features ([facility] [address] F33.2        Admission Condition: poor    Discharged Condition: stable    Indication for Admission: threat to self    History of Present Illnes (present tense wording is of findings from admission exam and are not necessarily indicative of current findings):   Cedric Ennis is a 23 y.o. male with significant past medical history of depression, anxiety who presented to the ED with suicidal ideation to end his life by carbon monoxide poisoning.     Per staff, patient has been in behavioral control since the mission to the unit, he has not required the use of any as needed medications for anxiety, agitation or sleep.     Today, Jasmina Snowden was interviewed in the day room. Tray Sp was cooperative with the assessment. Hershell Course states that he is an  who works 50+ hours per week. He endorses a depressed mood that has been increasing over the past 2 to 4 months. He states that his sleep is very sporadic, as well as his appetite. Patient states that he feels worthless, and shame that he is not better than who he is right now. Patient endorses a decrease in energy, a decrease in concentration, and hopelessness for the future.   Patient states that the [facility] Day holiday was a trigger for him, and that he was disappointed in himself that he did not get things done that he wanted to get done.  Patient states that he feels that he will never be good enough, and had feelings to end his life by carbon monoxide poisoning and he came to the ER. Patient states that he has been off of this medication for approximately 2 years. He states that he felt that the medication was not helping and so he went off of the medication.     Patient endorses generalized anxiety, states that he gets panic attacks where he cannot breathe, and he gets heart palpitations. Patient states that he hyper focuses on breathing, which seems to make the panic attack worse.     Patient denies any symptoms of julianna, denies any auditory or visual hallucinations, denies any obsessive-compulsive disorders, and denies any eating disorders. Patient denies any nightmares, or avoidance behavior.     Patient endorses a long history of depressive disorder, states that he started to see a psychologist around the age of 3, and had 3 psychiatric admissions at an adolescent at [facility] in [facility]. Hospital Course:   Upon admission, Thierno Loyd was provided a safe secure environment, introduced to unit milieu. Patient participated in groups and individual therapies. Meds were adjusted as noted below. After few days of hospital care, patient began to feel improvement. Depression lifted, thoughts to harm self ceased. Sleep improved, appetite was good. On morning rounds 6/9/2021, Thierno Loyd endorses feeling ready for discharge. Patient denies suicidal or homicidal ideations, denies hallucinations or delusions. Denies SE's from meds. It was decided that maximum benefit from hospital care had been achieved and patient can be discharged. Consults:   none    Significant Diagnostic Studies: Routine labs and diagnostics    Treatments: Psychotropic medications, therapy with group, milieu, and 1:1 with nurses, social workers, PAADILIA/CNP, and Attending physician.       Discharge Medications:  Discharge Medication List as of 6/9/2021 10:26 AM START taking these medications    Details   escitalopram (LEXAPRO) 10 MG tablet Take 1 tablet by mouth daily, Disp-30 tablet, R-3Normal              Core Measures statement:   Not applicable    Discharge Exam:  Level of consciousness:  Within normal limits  Appearance: Street clothes, seated, with good grooming  Behavior/Motor: No abnormalities noted  Attitude toward examiner:  Cooperative, attentive, good eye contact  Speech:  spontaneous, normal rate, normal volume and well articulated  Mood:  euthymic  Affect:  Full range  Thought processes:  linear, goal directed and coherent  Thought content:  denies homicidal ideation  Suicidal Ideation:  denies suicidal ideation  Delusions:  no evidence of delusions  Perceptual Disturbance:  denies any perceptual disturbance  Cognition:  Intact  Memory: age appropriate  Insight & Judgement: fair  Medication side effects: denies     Disposition: home    Patient Instructions: Activity: activity as tolerated  1. Patient instructed to take medications regularly and follow up with outpatient appointments. Follow-up as scheduled with [facility]       Signed:    Electronically signed by Zana Ambrose MD on 6/9/21 at 7:12 PM EDT    Time Spent on discharge is more than 35 minutes in the examination, evaluation, counseling and review of medications and discharge plan.

## 2021-06-09 NOTE — SUICIDE SAFETY PLAN
SAFETY PLAN    A suicide Safety Plan is a document that supports someone when they are having thoughts of suicide. Warning Signs that indicate a suicidal crisis may be developing: What (situations, thoughts, feelings, body sensations, behaviors, etc.) do you experience that lets you know you are beginning to think about suicide? 1. Isolating  2. Not answering the phone    Internal Coping Strategies:  What things can I do (relaxation techniques, hobbies, physical activities, etc.) to take my mind off my problems without contacting another person? 1. Ride my bike  2. Cook  3. Fish    People and social settings that provide distraction: Who can I call or where can I go to distract me? 1. Name: Radha Arriola  Phone: [phone]  2. Name: Car friend  3. Place: Car meets           4. Place: [place]     Professionals or [address] [place] [address] I can contact during a crisis: Who can I call for help - for example, my doctor, my psychiatrist, my psychologist, a mental health provider, a suicide hotline? 1. [address] [phone]    2. [facility] [phone]    3. Suicide Prevention Lifeline: 5-174-833-TALK (6665)    4. Local Behavioral Health Emergency Services  [facility] Hotline: 038    Crisis Text hotline: Text HOME to 025840    Making the environment safe: How can I make my environment (house/apartment/living space) safer? For example, can I remove guns, medications, and other items? 1. Take medications as prescribed  2.  Attend all scheduled appointments

## 2021-06-09 NOTE — GROUP NOTE
Group Therapy Note    Date: 6/9/2021    Group Start Time: 1100  Group End Time: 5104  Group Topic: Recreational    [facility], [address]    Attendees: 13         Patient's Goal:  To demonstrate increased interpersonal skills. Notes:  Patient attended group and actively participated in task at hand. Patient conversed appropriately with peers and Chris Hernandez. Status After Intervention:  Improved    Participation Level:  Active Listener and Interactive    Participation Quality: Appropriate, Attentive and Sharing      Speech:  normal      Thought Process/Content: Logical      Affective Functioning: Congruent      Mood: euthymic      Level of consciousness:  Alert, Oriented x4 and Attentive      Response to Learning: Able to verbalize current knowledge/experience, Able to verbalize/acknowledge new learning, Able to retain information, Capable of insight and Progressing to goal      Endings: None Reported       Modes of Intervention: Socialization, Exploration, Clarifying, Problem-solving, Activity, Media, Limit-setting and Reality-testing      Discipline Responsible: Psychoeducational Specialist      Signature:  Brant Mo

## 2021-06-09 NOTE — BH NOTE
Patient given tobacco quitline number 36769254229 at this time, refusing to call at this time, states \" I just dont want to quit now\"- patient given information as to the dangers of long term tobacco use. Continue to reinforce the importance of tobacco cessation.

## 2021-06-09 NOTE — BH NOTE
[facility]  Discharge Note    Pt discharged with followings belongings:   Dentures: None  Vision - Corrective Lenses: None  Hearing Aid: None  Jewelry: None  Body Piercings Removed: N/A  Clothing: Footwear, Pants, Undergarments (Comment)  Were All Patient Medications Collected?: Not Applicable  Other Valuables: Wallet (ID, insurance cards)   Valuables sent home with patient. Valuables retrieved from safe, Security envelope number:  [id number] and returned to patient. Patient education on aftercare instructions: yes  Information faxed to outpatient by nurse Patient verbalize understanding of AVS:  Yes. Pt discharged to private residence without difficulty via parent.    Status EXAM upon discharge:  Status and Exam  Normal: Yes  Facial Expression: Brightened  Affect: Appropriate  Level of Consciousness: Alert  Mood:Normal: No  Mood: Anxious  Motor Activity:Normal: Yes  Interview Behavior: Cooperative  Preception: [place] to Person, Vernadine Dave to Time, [place] to Place, [place] to Situation  Attention:Normal: Yes  Thought Processes: Other(See comment) (WNL)  Thought Content:Normal: Yes  Hallucinations: None  Delusions: No  Memory:Normal: Yes  Insight and Judgment: No  Insight and Judgment: Poor Insight  Present Suicidal Ideation: No  Present Homicidal Ideation: No      Metabolic Screening:    No results found for: LABA1C    No results found for: CHOL  No results found for: TRIG  No results found for: HDL  No components found for: LDLCAL  No results found for: Glorine Andrade, LPN

## 2021-06-09 NOTE — GROUP NOTE
Group Therapy Note    Date: 6/9/2021    Group Start Time: 1000  Group End Time: 9859  Group Topic: Psychotherapy    KARAN GARSIA    KRZYSZTOF Torres, PRISCILLAW        Group Therapy Note    Attendees: 8/20         Patient's Goal:  Increase interpersonal relationship skills    Notes:  Patient was as an active participant in group discussion    Status After Intervention:  Unchanged    Participation Level:  Active Listener and Interactive    Participation Quality: Appropriate, Attentive and Sharing      Speech:  normal      Thought Process/Content: Logical      Affective Functioning: Congruent      Mood: euthymic      Level of consciousness:  Alert, Oriented x4 and Attentive      Response to Learning: Able to verbalize current knowledge/experience and Able to verbalize/acknowledge new learning      Endings: None Reported    Modes of Intervention: Socialization and Exploration      Discipline Responsible: /Counselor      Signature:  KRZYSZTOF Torres, FORTINO

## 2021-10-22 ENCOUNTER — HOSPITAL ENCOUNTER (EMERGENCY)
Age: 21
Discharge: HOME | End: 2021-10-22
Attending: EMERGENCY MEDICINE
Payer: COMMERCIAL

## 2021-10-22 VITALS
SYSTOLIC BLOOD PRESSURE: 131 MMHG | TEMPERATURE: 98.1 F | OXYGEN SATURATION: 99 % | DIASTOLIC BLOOD PRESSURE: 63 MMHG | HEART RATE: 81 BPM | RESPIRATION RATE: 14 BRPM

## 2021-10-22 DIAGNOSIS — F33.2 SEVERE EPISODE OF RECURRENT MAJOR DEPRESSIVE DISORDER, WITHOUT PSYCHOTIC FEATURES (HCC): Primary | ICD-10-CM

## 2021-10-22 PROCEDURE — 99283 EMERGENCY DEPT VISIT LOW MDM: CPT

## 2021-10-22 NOTE — ED TRIAGE NOTES
Mode of arrival (squad #, walk in, police, etc) : walk in        Chief complaint(s): Depression        Arrival Note (brief scenario, treatment PTA, etc). : Pt states he has a hx of depression and has been feeling increasingly depressed. Pt denies SI/HI. C= \"Have you ever felt that you should Cut down on your drinking? \"  No  A= \"Have people Annoyed you by criticizing your drinking? \"  No  G= \"Have you ever felt bad or Guilty about your drinking? \"  No  E= \"Have you ever had a drink as an Eye-opener first thing in the morning to steady your nerves or to help a hangover? \"  No      Deferred []      Reason for deferring: N/A    *If yes to two or more: probable alcohol abuse. *

## 2021-10-22 NOTE — ED PROVIDER NOTES
EMERGENCY DEPARTMENT ENCOUNTER    Pt Name: Chente Vazquez  MRN: [id number]  [place] 2000  Date of evaluation: 10/22/21  CHIEF COMPLAINT       Chief Complaint   Patient presents with   [address] Problem    Depression     HISTORY OF PRESENT ILLNESS     Mental Health Problem  Presenting symptoms: depression    Presenting symptoms: no disorganized speech, no disorganized thought process, no hallucinations, no homicidal ideas, no suicidal thoughts, no suicidal threats and no suicide attempt    Degree of incapacity (severity):  Severe  Onset quality:  Gradual  Timing:  Constant  Progression:  Waxing and waning  Chronicity:  Recurrent  Treatment compliance:  Untreated  Relieved by:  Nothing  Worsened by:  Nothing  Ineffective treatments:  Antidepressants  Associated symptoms: feelings of worthlessness      Not happy with [place], wants care elsewhere  He is an       REVIEW OF SYSTEMS     Review of Systems   Psychiatric/Behavioral: Negative for hallucinations, homicidal ideas and suicidal ideas. All other systems reviewed and are negative. PASTMEDICAL HISTORY     Past Medical History:   Diagnosis Date    Psychiatric problem      Past Problem List  Patient Active Problem List   Diagnosis Code    Depression with suicidal ideation F32. A, R45.851    Severe episode of recurrent major depressive disorder, without psychotic features ([facility] [address] F33.2     SURGICAL HISTORY     History reviewed. No pertinent surgical history. CURRENT MEDICATIONS       Discharge Medication List as of 10/22/2021 10:51 AM      CONTINUE these medications which have NOT CHANGED    Details   escitalopram (LEXAPRO) 10 MG tablet Take 1 tablet by mouth daily, Disp-30 tablet, R-3Normal           ALLERGIES     is allergic to pcn [penicillins] and tylenol [acetaminophen]. FAMILY HISTORY     has no family status information on file.       SOCIAL HISTORY       Social History     Tobacco Use    Smoking status: Never Smoker    Smokeless Comments: Denies SI or HI         MEDICAL DECISION MAKING:   ED SW set her up with outpatient care at [facility] next week appointment made  Patient happy with this. Discussed with patient anticipatory guidance, discharge instructions, follow up with [facility] next week           Procedures    DIAGNOSTIC RESULTS       EMERGENCY DEPARTMENTCOURSE:         Vitals:    Vitals:    10/22/21 0951   BP: 131/63   Pulse: 81   Resp: 14   Temp: 98.1 °F (36.7 °C)   TempSrc: Oral   SpO2: 99%         FINAL IMPRESSION      1. Severe episode of recurrent major depressive disorder, without psychotic features [facility])          DISPOSITION/PLAN   DISPOSITION Decision To Discharge 10/22/2021 10:25:36 AM      PATIENT REFERRED TO:  [place] [address]  [phone]  [phone]      On 10/27/2021  @ 56 AM for assessment bring ID / Insurance card and wear a mask - during assessement they will schedule for meds with a doctor face to face and primary care appt    DISCHARGE MEDICATIONS:  Discharge Medication List as of 10/22/2021 10:51 AM        The care is provided during an unprecedented national emergency due to the novel coronavirus, COVID 19.   Goldy Dick MD  Attending Emergency Physician                    Goldy Dick MD  10/22/21 1400

## 2021-10-22 NOTE — ED NOTES
Provisional Diagnosis:    MDD without SI    Psychosocial and Contextual Factors:  Not linked, not on medications, stopped following up    C-SSRS Summary:  Prior [facility] admission June 2021    Patient: X  Family: X - sister and dad supportive  Agency: X- linked with [facility] - stopped going 2 months ago/stopped meds over a month ago    Substance Abuse -Denies any hx or use    Present Suicidal Behavior:  Denies any SI or attempts    Verbal:     Attempt:    Past Suicidal Behavior:  Hx of past ideation and past attempt 6 years ago    Verbal: X    Attempt: X      Self-Injurious/Self-Mutilation: -denies any hx       Violence Current or Past  X- denies      Trauma Identified:   X- denies    Protective Factors:   Stable housing, employed, insurance, support system, linked to [facility] [employer], transportation      Risk Factors:  Not taking medications, not following up, poor insight, poor coping/problem solving skills, not motivated      Clinical Summary:  Moy Cai is a single 23year old  male whom presented to the ER for increased depressed mood and sadness, low energy, fatigue that has been increasing over the last several weeks. Patient denies any SI HI AH VH but reports past hx of suicidal attempts and inpatient psych hospitalizations as an adolescent and an adult with one attempt to self harm 5 year ago. Patient reports poor sleep, appetite as OK, he denied any weight loss or gain. Patient reports no AOD use past or current he denies any issues with concentration or focusing he just reports sadness and depressed mood. Patient denies any legal issues, he reports he is an  out in [employer]. Patient reports good family support, denies any other complaints and is willing to be linked up to another agency.        Level of Care Disposition:  Patient does not meet criteria for inpatient psych and will be discharged with outpatient follow up with [facility] for inpatient therapy, medsomatic, PCP referral and assessment due to patient does not want to return back to [facility].

## 2021-10-22 NOTE — SUICIDE SAFETY PLAN
SAFETY PLAN    A Suicide Safety Plan is a document that supports someone when they are having thoughts of suicide. 1.    Warning Signs that indicate a suicidal crisis may be developing: What (situations, thoughts, feelings, body sensations, behaviors, etc.) do you experience that lets you know you are beginning to think about suicide? Not taking my medications  Suicidal ideations or thoughts of death / dying  Thoughts to harm others   Depressed mood /sadness / loneliness / low self-esteem /feelings of worthlessness / emptiness  Racing thoughts / taking risks / doing impulsive behaviors or decision making  Poor sleep / lack of sleep/ oversleeping  Chronic irritability / angry mood / can't stop crying  Hearing voices / seeing things   Feeling paranoid - thinking people are after you, following you, watching you / listening in on you  Wanting to use alcohol or drugs to forget or to numb the pain        2. Internal Coping Strategies:  What things can I do (relaxation techniques, hobbies, physical activities, etc.) to take my mind off my problems without contacting another person? A. Coping skills/ strategies  journal/ listen to music/ go for a walk/ read a book/ watch a [employer] movie/show / crafts / video game   B. Grounding techniques- eat a sour candy or hot cinnamon candy / focus on colors, sounds, smells, textures on things around you /  drink some herbal tea / eat a piece of dark chocolate / take a hot bath or shower / essential oils for smelling / meditate / color / arts and crafts        3. People and social settings that provide distraction: Who can I call or where can I go to distract me? Parents, siblings, relatives, friends, /, , coworkers, support group, therapist, doctor  park, support group, gym, [employer], etc    People whom I can ask for help: Who can I call when I need help - for example, friends, family, clergy, someone else?     My sister  My Dad  My friends  [facility] [phone]        4. Professionals or [address] I can contact during a crisis: Who can I call for help - for example, my doctor, my psychiatrist, my psychologist, a mental health provider, a suicide hotline? [place] (24 hours / 7 days a week access)  [phone])    COVID-19 Emotional Support Line: [phone]  Suicide Prevention Lifeline: 1-800-273-TALK (8830)  Text \"4Hope\" to 444239      5. Local Behavioral Health Emergency Crisis Services Numbers:      [place] (24 hours / 7 days a week acess)  [phone] ([phone])    COVID-19 Emotional Support Line: [phone]  Suicide Prevention Lifeline: 1-800-273-TALK (0203)  Text \"4Hope\" to [address]     1. BellyBio  Free tri that teaches a deep breathing technique useful in fighting anxiety and stress. A simple interface uses biofeedback to monitor your breathing. Sounds cascade with the movements of your belly, in rhythms reminiscent of waves on a beach. Charts also let you know how you're doing. A great tool when you need to slow down and breathe. 2. Operation Reach Out  Literally a lifesaving tri, this free intervention tool helps people who are having suicidal thoughts to reassess their thinking and get help. Recommended by followers of, who report that this tri has helped in suicidal crises. Developed by the [place], but useful to all. [place] a download even if you're not suicidal. You never know if you might need it. 3. eCBT Calm  Provides a set of tools to help you evaluate personal stress and anxiety, challenge distorted thoughts, and learn relaxation skills that have been scientifically validated in research on Cognitive Behavioral Therapy (CBT). Lots of background and useful information along with step-by-step guides. 4. Deep Sleep with Susana Ontiveros  Getting enough sleep is one of the foundations of mental health.  A personal favorite I listen to all the time, this straightforward tri features a warm, gentle voice guiding listeners through a Progressive Muscle Relaxation (PMR) session and into sleep. Features long or short induction options, and an alarm. 5. WhatsMyM3  A three minute depression and anxiety screen. Validated questionnaires assess symptoms of depression, anxiety, bipolar disorder, and PTSD, and combine into a score that indicates whether or not your life is impacted significantly by a mood disorder, recommending a course of action. The tri keeps a history of test results, to help you track your progress. 6. DBT Diary Card and Skills   Based on Dialectical Behavior Therapy (DBT) developed by psychologist Екатерина Mendez, this tri is a rich resource of self-help skills, reminders of the therapy principles, and coaching tools for coping. Created by a therapist with years of experience in the practice, this tri is not intended to replace a professional but helps people reinforce their treatment. 7. Optimism  Track your moods, keep a journal, and chart your recovery progress with this comprehensive tool for depression, bipolar disorder, and anxiety disorders. One of the most popular mood tracking apps available, with plenty of features. Free. 8. iSleepEasy  A calm female voice helps you quell anxieties and take the time to relax and sleep, in an array of guided meditations. Separately controlled voice and music tracks, flexible lengths, and an alarm. Includes a special wee hours rescue track, and tips for falling asleep. Developed by [employer], who offer an great line of relaxation apps. 9. Magic Window  Living Pictures  Not technically a mental health tri, it makes no miraculous claims about curbing anxiety. However, there is independent research indicating that taking breaks and getting exposure to nature, even in videos, can reduce stress.  This tri offers an assortment of peaceful, ambient nature scenes from beautiful spots around the world. 10. Relax Melodies  A popular free relaxation sound and music tri. Mix and match nature sounds with new age music; it's aslazar to listen to birds in the rain while a piano softly plays. 6.   Making the environment safe: How can I make my environment (house/apartment/living space) safer? Remove weapons, cutting objects, get rid of extra medications, household chemicals. If you begin to have suicidal ideations -Involve your support system to implement your safety plan right away. Call 911 immediately or go to your local Emergency Room if you cannot contract to be safe.